# Patient Record
Sex: MALE | Race: WHITE | NOT HISPANIC OR LATINO | Employment: OTHER | URBAN - METROPOLITAN AREA
[De-identification: names, ages, dates, MRNs, and addresses within clinical notes are randomized per-mention and may not be internally consistent; named-entity substitution may affect disease eponyms.]

---

## 2023-02-07 PROBLEM — K76.89 LIVER CYST: Status: ACTIVE | Noted: 2023-02-07

## 2023-02-07 PROBLEM — H61.23 IMPACTED CERUMEN OF BOTH EARS: Status: ACTIVE | Noted: 2023-02-07

## 2023-02-07 PROBLEM — R73.03 PREDIABETES: Status: ACTIVE | Noted: 2023-02-07

## 2023-02-07 PROBLEM — G47.00 INSOMNIA: Status: ACTIVE | Noted: 2023-02-07

## 2023-02-07 PROBLEM — H65.91 FLUID LEVEL BEHIND TYMPANIC MEMBRANE OF RIGHT EAR: Status: ACTIVE | Noted: 2023-02-07

## 2023-02-07 PROBLEM — I71.21 THORACIC ASCENDING AORTIC ANEURYSM (CMS-HCC): Status: ACTIVE | Noted: 2023-02-07

## 2023-02-07 PROBLEM — J30.9 ALLERGIC RHINITIS: Status: ACTIVE | Noted: 2023-02-07

## 2023-02-07 PROBLEM — N28.1 CYST OF RIGHT KIDNEY: Status: ACTIVE | Noted: 2023-02-07

## 2023-02-07 PROBLEM — I25.10 CAD (CORONARY ARTERY DISEASE): Status: ACTIVE | Noted: 2023-02-07

## 2023-02-07 PROBLEM — N52.9 ERECTILE DYSFUNCTION: Status: ACTIVE | Noted: 2023-02-07

## 2023-02-07 PROBLEM — R17 ELEVATED BILIRUBIN: Status: ACTIVE | Noted: 2023-02-07

## 2023-02-07 PROBLEM — E78.5 HYPERLIPIDEMIA: Status: ACTIVE | Noted: 2023-02-07

## 2023-02-07 PROBLEM — I10 HYPERTENSION: Status: ACTIVE | Noted: 2023-02-07

## 2023-02-07 PROBLEM — J01.90 ACUTE SINUSITIS: Status: ACTIVE | Noted: 2023-02-07

## 2023-02-07 PROBLEM — H93.8X3 SENSATION OF FULLNESS IN BOTH EARS: Status: ACTIVE | Noted: 2023-02-07

## 2023-02-07 PROBLEM — F41.9 ANXIETY: Status: ACTIVE | Noted: 2023-02-07

## 2023-02-07 PROBLEM — R97.20 INCREASED PROSTATE SPECIFIC ANTIGEN (PSA) VELOCITY: Status: ACTIVE | Noted: 2023-02-07

## 2023-02-07 PROBLEM — R93.1 AGATSTON CORONARY ARTERY CALCIUM SCORE GREATER THAN 400: Status: ACTIVE | Noted: 2023-02-07

## 2023-02-07 PROBLEM — R91.8 LUNG NODULES: Status: ACTIVE | Noted: 2023-02-07

## 2023-02-07 RX ORDER — ATORVASTATIN CALCIUM 40 MG/1
1 TABLET, FILM COATED ORAL NIGHTLY
COMMUNITY
Start: 2013-02-20

## 2023-02-07 RX ORDER — SILDENAFIL 100 MG/1
100 TABLET, FILM COATED ORAL AS NEEDED
COMMUNITY
Start: 2013-03-27 | End: 2023-06-27 | Stop reason: SDUPTHER

## 2023-02-07 RX ORDER — ASPIRIN 81 MG/1
1 TABLET ORAL DAILY
COMMUNITY
Start: 2022-06-02

## 2023-02-07 RX ORDER — LOSARTAN POTASSIUM 100 MG/1
1 TABLET ORAL DAILY
COMMUNITY
Start: 2013-01-17 | End: 2023-06-20 | Stop reason: SDUPTHER

## 2023-02-07 RX ORDER — QUINIDINE SULFATE 200 MG
1 TABLET ORAL DAILY
COMMUNITY
Start: 2022-03-31

## 2023-02-07 RX ORDER — QUETIAPINE FUMARATE 25 MG/1
1 TABLET, FILM COATED ORAL NIGHTLY
COMMUNITY
Start: 2020-07-07 | End: 2024-03-15

## 2023-04-06 ENCOUNTER — OFFICE VISIT (OUTPATIENT)
Dept: PRIMARY CARE | Facility: CLINIC | Age: 73
End: 2023-04-06
Payer: MEDICARE

## 2023-04-06 VITALS
DIASTOLIC BLOOD PRESSURE: 80 MMHG | SYSTOLIC BLOOD PRESSURE: 152 MMHG | HEIGHT: 67 IN | BODY MASS INDEX: 38.83 KG/M2 | WEIGHT: 247.4 LBS | TEMPERATURE: 96.9 F | HEART RATE: 82 BPM

## 2023-04-06 DIAGNOSIS — Z12.12 ENCOUNTER FOR COLORECTAL CANCER SCREENING: ICD-10-CM

## 2023-04-06 DIAGNOSIS — Z00.00 MEDICARE ANNUAL WELLNESS VISIT, SUBSEQUENT: Primary | ICD-10-CM

## 2023-04-06 DIAGNOSIS — I10 PRIMARY HYPERTENSION: ICD-10-CM

## 2023-04-06 DIAGNOSIS — E55.9 VITAMIN D DEFICIENCY: ICD-10-CM

## 2023-04-06 DIAGNOSIS — R73.03 PREDIABETES: ICD-10-CM

## 2023-04-06 DIAGNOSIS — E66.01 OBESITY, MORBID (MULTI): ICD-10-CM

## 2023-04-06 DIAGNOSIS — I71.21 ANEURYSM OF ASCENDING AORTA WITHOUT RUPTURE (CMS-HCC): ICD-10-CM

## 2023-04-06 DIAGNOSIS — R97.20 INCREASED PROSTATE SPECIFIC ANTIGEN (PSA) VELOCITY: ICD-10-CM

## 2023-04-06 DIAGNOSIS — Z00.00 WELLNESS EXAMINATION: ICD-10-CM

## 2023-04-06 DIAGNOSIS — Z12.11 ENCOUNTER FOR COLORECTAL CANCER SCREENING: ICD-10-CM

## 2023-04-06 PROCEDURE — 3079F DIAST BP 80-89 MM HG: CPT | Performed by: STUDENT IN AN ORGANIZED HEALTH CARE EDUCATION/TRAINING PROGRAM

## 2023-04-06 PROCEDURE — 3077F SYST BP >= 140 MM HG: CPT | Performed by: STUDENT IN AN ORGANIZED HEALTH CARE EDUCATION/TRAINING PROGRAM

## 2023-04-06 PROCEDURE — 1159F MED LIST DOCD IN RCRD: CPT | Performed by: STUDENT IN AN ORGANIZED HEALTH CARE EDUCATION/TRAINING PROGRAM

## 2023-04-06 PROCEDURE — 1170F FXNL STATUS ASSESSED: CPT | Performed by: STUDENT IN AN ORGANIZED HEALTH CARE EDUCATION/TRAINING PROGRAM

## 2023-04-06 PROCEDURE — 99397 PER PM REEVAL EST PAT 65+ YR: CPT | Performed by: STUDENT IN AN ORGANIZED HEALTH CARE EDUCATION/TRAINING PROGRAM

## 2023-04-06 PROCEDURE — G0439 PPPS, SUBSEQ VISIT: HCPCS | Performed by: STUDENT IN AN ORGANIZED HEALTH CARE EDUCATION/TRAINING PROGRAM

## 2023-04-06 PROCEDURE — 1160F RVW MEDS BY RX/DR IN RCRD: CPT | Performed by: STUDENT IN AN ORGANIZED HEALTH CARE EDUCATION/TRAINING PROGRAM

## 2023-04-06 PROCEDURE — 1036F TOBACCO NON-USER: CPT | Performed by: STUDENT IN AN ORGANIZED HEALTH CARE EDUCATION/TRAINING PROGRAM

## 2023-04-06 RX ORDER — AMLODIPINE BESYLATE 5 MG/1
1 TABLET ORAL DAILY
COMMUNITY
Start: 2023-03-27

## 2023-04-06 RX ORDER — FLUTICASONE PROPIONATE 50 MCG
1 SPRAY, SUSPENSION (ML) NASAL
COMMUNITY
Start: 2023-02-06 | End: 2023-05-08

## 2023-04-06 ASSESSMENT — PATIENT HEALTH QUESTIONNAIRE - PHQ9
SUM OF ALL RESPONSES TO PHQ9 QUESTIONS 1 AND 2: 0
2. FEELING DOWN, DEPRESSED OR HOPELESS: NOT AT ALL
1. LITTLE INTEREST OR PLEASURE IN DOING THINGS: NOT AT ALL

## 2023-04-06 ASSESSMENT — ENCOUNTER SYMPTOMS
DIARRHEA: 0
FREQUENCY: 0
NERVOUS/ANXIOUS: 0
VOMITING: 0
OCCASIONAL FEELINGS OF UNSTEADINESS: 0
ARTHRALGIAS: 0
MYALGIAS: 0
COUGH: 0
SORE THROAT: 0
DYSPHORIC MOOD: 0
CHILLS: 0
PALPITATIONS: 0
DIZZINESS: 0
SHORTNESS OF BREATH: 0
DEPRESSION: 0
CONSTIPATION: 0
FEVER: 0
DYSURIA: 0
FATIGUE: 0
NUMBNESS: 0
LOSS OF SENSATION IN FEET: 0
LIGHT-HEADEDNESS: 0
NAUSEA: 0
RHINORRHEA: 0
ABDOMINAL PAIN: 0
COLOR CHANGE: 0

## 2023-04-06 ASSESSMENT — ACTIVITIES OF DAILY LIVING (ADL)
DOING_HOUSEWORK: INDEPENDENT
MANAGING_FINANCES: INDEPENDENT
TAKING_MEDICATION: INDEPENDENT
DRESSING: INDEPENDENT
BATHING: INDEPENDENT
GROCERY_SHOPPING: INDEPENDENT

## 2023-04-06 NOTE — PROGRESS NOTES
Subjective   Reason for Visit: Sridhar Rodriges is an 72 y.o. male here for a Medicare Wellness visit.     Past Medical, Surgical, and Family History reviewed and updated in chart.    Reviewed all medications by prescribing practitioner or clinical pharmacist (such as prescriptions, OTCs, herbal therapies and supplements) and documented in the medical record.    HPI    No acute concerns or complaints today.  He follows with cardiology once a year.  He has an ascending thoracic aorta. He is getting that followed yearly.    He has a history of an elevated PSA. He had a prostate biopsy in the past about 15-20 years ago. That was normal.    Dental: Once yearly.  Vision: Wears glasses. He is due for another appointment.    Last Colonoscopy: EGD in 2017. No recent colonoscopy in ~2010.  AAA Screening: Never smoked. Not indicated.  Low Dose Lung CT Screening: Not indicated. Never smoked.    Influenza: Up to date  Tetanus: 2020  Pneumonia: Complete  COVID: Up to date  Shingles: Not given. Recommended.       Patient Care Team:  Juan Martinez MD as PCP - General  Mega Beal MD as PCP - [employer]age PCP  Betsy Dubois MD as Consulting Physician (Cardiology)     Review of Systems   Constitutional:  Negative for chills, fatigue and fever.   HENT:  Negative for congestion, rhinorrhea and sore throat.    Eyes:  Negative for visual disturbance.   Respiratory:  Negative for cough and shortness of breath.    Cardiovascular:  Negative for chest pain and palpitations.   Gastrointestinal:  Negative for abdominal pain, constipation, diarrhea, nausea and vomiting.   Genitourinary:  Negative for dysuria and frequency.   Musculoskeletal:  Negative for arthralgias and myalgias.   Skin:  Negative for color change and rash.   Neurological:  Negative for dizziness, light-headedness and numbness.   Psychiatric/Behavioral:  Negative for dysphoric mood. The patient is not nervous/anxious.        Objective   Vitals:  /80    "Pulse 82   Temp 36.1 °C (96.9 °F) (Temporal)   Ht 1.702 m (5' 7\")   Wt 112 kg (247 lb 6.4 oz)   BMI 38.75 kg/m²       Physical Exam  Vitals and nursing note reviewed.   Constitutional:       General: He is not in acute distress.     Appearance: Normal appearance. He is normal weight. He is not ill-appearing or toxic-appearing.   HENT:      Head: Normocephalic and atraumatic.      Right Ear: Tympanic membrane, ear canal and external ear normal.      Left Ear: Tympanic membrane, ear canal and external ear normal.      Nose: Nose normal.      Mouth/Throat:      Mouth: Mucous membranes are moist.      Pharynx: Oropharynx is clear.   Eyes:      Extraocular Movements: Extraocular movements intact.      Conjunctiva/sclera: Conjunctivae normal.      Pupils: Pupils are equal, round, and reactive to light.   Cardiovascular:      Rate and Rhythm: Normal rate and regular rhythm.      Pulses: Normal pulses.      Heart sounds: Normal heart sounds.   Pulmonary:      Effort: Pulmonary effort is normal.      Breath sounds: Normal breath sounds.   Abdominal:      General: Abdomen is flat. Bowel sounds are normal.      Palpations: Abdomen is soft.   Musculoskeletal:         General: Normal range of motion.      Cervical back: Normal range of motion and neck supple.   Skin:     General: Skin is warm and dry.   Neurological:      General: No focal deficit present.      Mental Status: He is alert and oriented to person, place, and time. Mental status is at baseline.      Cranial Nerves: No cranial nerve deficit.      Sensory: No sensory deficit.      Motor: No weakness.   Psychiatric:         Mood and Affect: Mood normal.         Behavior: Behavior normal.         Thought Content: Thought content normal.         Judgment: Judgment normal.         Assessment/Plan   Problem List Items Addressed This Visit          Circulatory    Hypertension    Current Assessment & Plan     Blood pressure elevated today in the office.  Reports that " with his home blood pressure cuff he is in the 130s over 80s.  Doing well on the amlodipine he started about a week ago.  We will follow-up for nurse visit blood pressure check in 2 to 3 weeks after his trip to [place].  He will bring a log of his pressures and home blood pressure cuff with him.         Relevant Orders    CBC and Auto Differential    TSH with reflex to Free T4 if abnormal    Thoracic ascending aortic aneurysm (CMS/HCC)    Overview     Following with cardiology.         Current Assessment & Plan     Patient is following with cardiology.  He has an appointment for upcoming testing in May and follow-up in July.            Endocrine/Metabolic    Prediabetes    Current Assessment & Plan     Blood work ordered to be done including A1c.         Relevant Orders    CBC and Auto Differential    Vitamin D, Total    Hemoglobin A1C    Obesity, morbid (CMS/HCC)    Current Assessment & Plan     Discussed healthy diet and exercise.  Blood work ordered.         Relevant Orders    Vitamin D, Total       Other    Increased prostate specific antigen (PSA) velocity    Current Assessment & Plan     History of increased PSA level.  Repeat level ordered.         Relevant Orders    Prostate Specific Antigen    Encounter for colorectal cancer screening    Current Assessment & Plan     Colonoscopy ordered.  He will call if he does not receive a call to be scheduled within the next week.  He thinks he will probably have to do it over the summer after completing testing for his cardiologist.         Relevant Orders    Colonoscopy    Medicare annual wellness visit, subsequent - Primary    Current Assessment & Plan     Screening questions completed.  No acute concerns or complaints today.  He follows regularly with cardiology and has upcoming testing scheduled.  Reordered colonoscopy.  reviewed recent fasting lab work from cardiology.  Ordered additional lab work to be done.         Relevant Orders    CBC and Auto  Differential    Prostate Specific Antigen    TSH with reflex to Free T4 if abnormal    Urinalysis with Reflex Microscopic    Vitamin D, Total    Hemoglobin A1C    Colonoscopy     Other Visit Diagnoses       Wellness examination        Relevant Orders    CBC and Auto Differential    Prostate Specific Antigen    TSH with reflex to Free T4 if abnormal    Urinalysis with Reflex Microscopic    Vitamin D, Total    Hemoglobin A1C    Colonoscopy    Vitamin D deficiency        Relevant Orders    Vitamin D, Total          Patient presenting for Medicare annual as well as regular wellness visit.  Blood work ordered to be done.  Reviewed recent results of fasting lab work for cardiology.

## 2023-04-06 NOTE — PATIENT INSTRUCTIONS
Call for nurse visit blood pressure check in about 2 weeks.  Bring log of pressures and your home cuff with you to the appointment.

## 2023-05-02 ENCOUNTER — LAB (OUTPATIENT)
Dept: LAB | Facility: LAB | Age: 73
End: 2023-05-02
Payer: MEDICARE

## 2023-05-02 DIAGNOSIS — R97.20 INCREASED PROSTATE SPECIFIC ANTIGEN (PSA) VELOCITY: ICD-10-CM

## 2023-05-02 DIAGNOSIS — Z00.00 MEDICARE ANNUAL WELLNESS VISIT, SUBSEQUENT: ICD-10-CM

## 2023-05-02 DIAGNOSIS — E55.9 VITAMIN D DEFICIENCY: ICD-10-CM

## 2023-05-02 DIAGNOSIS — R73.03 PREDIABETES: ICD-10-CM

## 2023-05-02 DIAGNOSIS — E66.01 OBESITY, MORBID (MULTI): ICD-10-CM

## 2023-05-02 DIAGNOSIS — Z00.00 WELLNESS EXAMINATION: ICD-10-CM

## 2023-05-02 DIAGNOSIS — I10 PRIMARY HYPERTENSION: ICD-10-CM

## 2023-05-02 LAB
ANION GAP IN SER/PLAS: 11 MMOL/L (ref 10–20)
APPEARANCE, URINE: CLEAR
BASOPHILS (10*3/UL) IN BLOOD BY AUTOMATED COUNT: 0.03 X10E9/L (ref 0–0.1)
BASOPHILS/100 LEUKOCYTES IN BLOOD BY AUTOMATED COUNT: 0.5 % (ref 0–2)
BILIRUBIN, URINE: NEGATIVE
BLOOD, URINE: NEGATIVE
CALCIUM (MG/DL) IN SER/PLAS: 9.4 MG/DL (ref 8.6–10.6)
CARBON DIOXIDE, TOTAL (MMOL/L) IN SER/PLAS: 29 MMOL/L (ref 21–32)
CHLORIDE (MMOL/L) IN SER/PLAS: 105 MMOL/L (ref 98–107)
COLOR, URINE: NORMAL
CREATININE (MG/DL) IN SER/PLAS: 0.82 MG/DL (ref 0.5–1.3)
EOSINOPHILS (10*3/UL) IN BLOOD BY AUTOMATED COUNT: 0.21 X10E9/L (ref 0–0.4)
EOSINOPHILS/100 LEUKOCYTES IN BLOOD BY AUTOMATED COUNT: 3.2 % (ref 0–6)
ERYTHROCYTE DISTRIBUTION WIDTH (RATIO) BY AUTOMATED COUNT: 12 % (ref 11.5–14.5)
ERYTHROCYTE MEAN CORPUSCULAR HEMOGLOBIN CONCENTRATION (G/DL) BY AUTOMATED: 32.5 G/DL (ref 32–36)
ERYTHROCYTE MEAN CORPUSCULAR VOLUME (FL) BY AUTOMATED COUNT: 92 FL (ref 80–100)
ERYTHROCYTES (10*6/UL) IN BLOOD BY AUTOMATED COUNT: 4.96 X10E12/L (ref 4.5–5.9)
ESTIMATED AVERAGE GLUCOSE FOR HBA1C: 140 MG/DL
GFR MALE: >90 ML/MIN/1.73M2
GLUCOSE (MG/DL) IN SER/PLAS: 128 MG/DL (ref 74–99)
GLUCOSE, URINE: NEGATIVE MG/DL
HEMATOCRIT (%) IN BLOOD BY AUTOMATED COUNT: 45.6 % (ref 41–52)
HEMOGLOBIN (G/DL) IN BLOOD: 14.8 G/DL (ref 13.5–17.5)
HEMOGLOBIN A1C/HEMOGLOBIN TOTAL IN BLOOD: 6.5 %
IMMATURE GRANULOCYTES/100 LEUKOCYTES IN BLOOD BY AUTOMATED COUNT: 0.6 % (ref 0–0.9)
KETONES, URINE: NEGATIVE MG/DL
LEUKOCYTE ESTERASE, URINE: NEGATIVE
LEUKOCYTES (10*3/UL) IN BLOOD BY AUTOMATED COUNT: 6.6 X10E9/L (ref 4.4–11.3)
LYMPHOCYTES (10*3/UL) IN BLOOD BY AUTOMATED COUNT: 1.59 X10E9/L (ref 0.8–3)
LYMPHOCYTES/100 LEUKOCYTES IN BLOOD BY AUTOMATED COUNT: 24 % (ref 13–44)
MONOCYTES (10*3/UL) IN BLOOD BY AUTOMATED COUNT: 0.62 X10E9/L (ref 0.05–0.8)
MONOCYTES/100 LEUKOCYTES IN BLOOD BY AUTOMATED COUNT: 9.4 % (ref 2–10)
NEUTROPHILS (10*3/UL) IN BLOOD BY AUTOMATED COUNT: 4.14 X10E9/L (ref 1.6–5.5)
NEUTROPHILS/100 LEUKOCYTES IN BLOOD BY AUTOMATED COUNT: 62.3 % (ref 40–80)
NITRITE, URINE: NEGATIVE
NRBC (PER 100 WBCS) BY AUTOMATED COUNT: 0 /100 WBC (ref 0–0)
PH, URINE: 7 (ref 5–8)
PLATELETS (10*3/UL) IN BLOOD AUTOMATED COUNT: 206 X10E9/L (ref 150–450)
POTASSIUM (MMOL/L) IN SER/PLAS: 4.5 MMOL/L (ref 3.5–5.3)
PROTEIN, URINE: NEGATIVE MG/DL
SODIUM (MMOL/L) IN SER/PLAS: 140 MMOL/L (ref 136–145)
SPECIFIC GRAVITY, URINE: 1.01 (ref 1–1.03)
UREA NITROGEN (MG/DL) IN SER/PLAS: 14 MG/DL (ref 6–23)
UROBILINOGEN, URINE: <2 MG/DL (ref 0–1.9)

## 2023-05-02 PROCEDURE — 36415 COLL VENOUS BLD VENIPUNCTURE: CPT

## 2023-05-02 PROCEDURE — 84443 ASSAY THYROID STIM HORMONE: CPT

## 2023-05-02 PROCEDURE — 85025 COMPLETE CBC W/AUTO DIFF WBC: CPT

## 2023-05-02 PROCEDURE — 83036 HEMOGLOBIN GLYCOSYLATED A1C: CPT

## 2023-05-02 PROCEDURE — 82306 VITAMIN D 25 HYDROXY: CPT

## 2023-05-02 PROCEDURE — 81003 URINALYSIS AUTO W/O SCOPE: CPT

## 2023-05-02 PROCEDURE — 84153 ASSAY OF PSA TOTAL: CPT

## 2023-05-03 LAB
CALCIDIOL (25 OH VITAMIN D3) (NG/ML) IN SER/PLAS: 38 NG/ML
PROSTATE SPECIFIC AG (NG/ML) IN SER/PLAS: 2.88 NG/ML (ref 0–4)
THYROTROPIN (MIU/L) IN SER/PLAS BY DETECTION LIMIT <= 0.05 MIU/L: 1.38 MIU/L (ref 0.44–3.98)

## 2023-05-05 DIAGNOSIS — J30.9 ALLERGIC RHINITIS, UNSPECIFIED: ICD-10-CM

## 2023-05-08 RX ORDER — FLUTICASONE PROPIONATE 50 MCG
SPRAY, SUSPENSION (ML) NASAL
Qty: 48 ML | Refills: 1 | Status: SHIPPED | OUTPATIENT
Start: 2023-05-08

## 2023-06-20 DIAGNOSIS — I10 PRIMARY HYPERTENSION: ICD-10-CM

## 2023-06-21 RX ORDER — LOSARTAN POTASSIUM 100 MG/1
100 TABLET ORAL DAILY
Qty: 90 TABLET | Refills: 1 | Status: SHIPPED | OUTPATIENT
Start: 2023-06-21 | End: 2023-12-18

## 2023-06-27 ENCOUNTER — TELEPHONE (OUTPATIENT)
Dept: PRIMARY CARE | Facility: CLINIC | Age: 73
End: 2023-06-27
Payer: MEDICARE

## 2023-06-27 DIAGNOSIS — N52.9 ERECTILE DYSFUNCTION, UNSPECIFIED ERECTILE DYSFUNCTION TYPE: ICD-10-CM

## 2023-06-27 RX ORDER — SILDENAFIL 100 MG/1
100 TABLET, FILM COATED ORAL AS NEEDED
Qty: 10 TABLET | Refills: 1 | Status: SHIPPED | OUTPATIENT
Start: 2023-06-27 | End: 2023-06-29 | Stop reason: SDUPTHER

## 2023-06-29 DIAGNOSIS — N52.9 ERECTILE DYSFUNCTION, UNSPECIFIED ERECTILE DYSFUNCTION TYPE: ICD-10-CM

## 2023-06-29 RX ORDER — SILDENAFIL 100 MG/1
100 TABLET, FILM COATED ORAL AS NEEDED
Qty: 10 TABLET | Refills: 1 | Status: SHIPPED | OUTPATIENT
Start: 2023-06-29 | End: 2023-09-27

## 2023-06-29 RX ORDER — SILDENAFIL 100 MG/1
100 TABLET, FILM COATED ORAL DAILY PRN
Qty: 10 TABLET | Refills: 1 | OUTPATIENT
Start: 2023-06-29 | End: 2023-07-29

## 2023-12-17 DIAGNOSIS — I10 PRIMARY HYPERTENSION: ICD-10-CM

## 2023-12-18 RX ORDER — LOSARTAN POTASSIUM 100 MG/1
100 TABLET ORAL DAILY
Qty: 90 TABLET | Refills: 1 | Status: SHIPPED | OUTPATIENT
Start: 2023-12-18 | End: 2024-06-07

## 2024-03-14 DIAGNOSIS — F41.9 ANXIETY: Primary | ICD-10-CM

## 2024-03-15 RX ORDER — QUETIAPINE FUMARATE 25 MG/1
25 TABLET, FILM COATED ORAL NIGHTLY
Qty: 90 TABLET | Refills: 1 | Status: SHIPPED | OUTPATIENT
Start: 2024-03-15

## 2024-06-07 DIAGNOSIS — I10 PRIMARY HYPERTENSION: ICD-10-CM

## 2024-06-07 RX ORDER — LOSARTAN POTASSIUM 100 MG/1
100 TABLET ORAL DAILY
Qty: 90 TABLET | Refills: 1 | Status: SHIPPED | OUTPATIENT
Start: 2024-06-07

## 2024-07-29 DIAGNOSIS — E78.5 HYPERLIPIDEMIA, UNSPECIFIED: ICD-10-CM

## 2024-07-30 RX ORDER — ATORVASTATIN CALCIUM 40 MG/1
40 TABLET, FILM COATED ORAL NIGHTLY
Qty: 90 TABLET | Refills: 3 | Status: SHIPPED | OUTPATIENT
Start: 2024-07-30

## 2024-09-08 DIAGNOSIS — F41.9 ANXIETY: ICD-10-CM

## 2024-09-08 DIAGNOSIS — I10 PRIMARY HYPERTENSION: Primary | ICD-10-CM

## 2024-09-08 DIAGNOSIS — E55.9 VITAMIN D DEFICIENCY: ICD-10-CM

## 2024-09-08 DIAGNOSIS — R97.20 INCREASED PROSTATE SPECIFIC ANTIGEN (PSA) VELOCITY: ICD-10-CM

## 2024-09-08 DIAGNOSIS — R73.03 PREDIABETES: ICD-10-CM

## 2024-09-11 RX ORDER — QUETIAPINE FUMARATE 25 MG/1
25 TABLET, FILM COATED ORAL NIGHTLY
Qty: 90 TABLET | Refills: 0 | Status: SHIPPED | OUTPATIENT
Start: 2024-09-11

## 2024-09-11 NOTE — TELEPHONE ENCOUNTER
Pt scheduled for AWV on 11/12/24; pt will go to lab downstairs for blood work; pt requesting order for A1C as well and pt would like to know it waiting till this appt for flu shot is acceptable or should he get it earlier than 11/12? Please advise

## 2024-09-12 NOTE — TELEPHONE ENCOUNTER
Patient informed flu shot can be done late Sept or early Oct. Blood work orders have been entered.

## 2024-10-02 ENCOUNTER — TELEPHONE (OUTPATIENT)
Dept: PRIMARY CARE | Facility: CLINIC | Age: 74
End: 2024-10-02
Payer: MEDICARE

## 2024-10-02 DIAGNOSIS — N52.9 ERECTILE DYSFUNCTION, UNSPECIFIED ERECTILE DYSFUNCTION TYPE: ICD-10-CM

## 2024-10-02 RX ORDER — SILDENAFIL 100 MG/1
100 TABLET, FILM COATED ORAL AS NEEDED
Qty: 16 TABLET | Refills: 1 | Status: SHIPPED | OUTPATIENT
Start: 2024-10-02 | End: 2024-10-04 | Stop reason: SDUPTHER

## 2024-10-02 NOTE — TELEPHONE ENCOUNTER
Fax request from [facility] requesting a refill on:    sildenafil (Viagra) 100 mg tablet   Take 1 tablet (100 mg) by mouth if needed for erectile dysfunction.   Quantity: 10 tablet     [id number] - [place] - [address]   [address] [place] [address]  Phone: [phone]  Fax: [phone]

## 2024-10-04 ENCOUNTER — TELEPHONE (OUTPATIENT)
Dept: PRIMARY CARE | Facility: CLINIC | Age: 74
End: 2024-10-04
Payer: MEDICARE

## 2024-10-04 DIAGNOSIS — N52.9 ERECTILE DYSFUNCTION, UNSPECIFIED ERECTILE DYSFUNCTION TYPE: ICD-10-CM

## 2024-10-04 RX ORDER — SILDENAFIL 100 MG/1
100 TABLET, FILM COATED ORAL AS NEEDED
Qty: 16 TABLET | Refills: 1 | Status: SHIPPED | OUTPATIENT
Start: 2024-10-04 | End: 2025-01-02

## 2024-10-04 NOTE — TELEPHONE ENCOUNTER
Fax request from [facility] requesting a refill on:    sildenafil (Viagra) 100 mg tablet   Take 1 tablet (100 mg) by mouth if needed for erectile dysfunction.   Quantity: 16 tablet     [id number] - [place] - [address]   [address] [place] [address]  Phone: [phone]  Fax: [phone]

## 2024-10-15 DIAGNOSIS — I10 ESSENTIAL (PRIMARY) HYPERTENSION: ICD-10-CM

## 2024-10-16 RX ORDER — AMLODIPINE BESYLATE 5 MG/1
5 TABLET ORAL DAILY
Qty: 90 TABLET | Refills: 3 | Status: SHIPPED | OUTPATIENT
Start: 2024-10-16

## 2024-11-08 ENCOUNTER — LAB (OUTPATIENT)
Dept: LAB | Facility: LAB | Age: 74
End: 2024-11-08
Payer: MEDICARE

## 2024-11-08 DIAGNOSIS — R97.20 INCREASED PROSTATE SPECIFIC ANTIGEN (PSA) VELOCITY: ICD-10-CM

## 2024-11-08 DIAGNOSIS — I10 PRIMARY HYPERTENSION: ICD-10-CM

## 2024-11-08 DIAGNOSIS — E55.9 VITAMIN D DEFICIENCY: ICD-10-CM

## 2024-11-08 DIAGNOSIS — R73.03 PREDIABETES: ICD-10-CM

## 2024-11-08 LAB
25(OH)D3 SERPL-MCNC: 41 NG/ML (ref 30–100)
ALBUMIN SERPL BCP-MCNC: 4.4 G/DL (ref 3.4–5)
ALP SERPL-CCNC: 100 U/L (ref 33–136)
ALT SERPL W P-5'-P-CCNC: 19 U/L (ref 10–52)
ANION GAP SERPL CALC-SCNC: 11 MMOL/L (ref 10–20)
AST SERPL W P-5'-P-CCNC: 17 U/L (ref 9–39)
BASOPHILS # BLD AUTO: 0.03 X10*3/UL (ref 0–0.1)
BASOPHILS NFR BLD AUTO: 0.4 %
BILIRUB SERPL-MCNC: 1.5 MG/DL (ref 0–1.2)
BUN SERPL-MCNC: 15 MG/DL (ref 6–23)
CALCIUM SERPL-MCNC: 9.3 MG/DL (ref 8.6–10.6)
CHLORIDE SERPL-SCNC: 103 MMOL/L (ref 98–107)
CHOLEST SERPL-MCNC: 130 MG/DL (ref 0–199)
CHOLESTEROL/HDL RATIO: 3.2
CO2 SERPL-SCNC: 30 MMOL/L (ref 21–32)
CREAT SERPL-MCNC: 0.79 MG/DL (ref 0.5–1.3)
CREAT UR-MCNC: 85.4 MG/DL (ref 20–370)
EGFRCR SERPLBLD CKD-EPI 2021: >90 ML/MIN/1.73M*2
EOSINOPHIL # BLD AUTO: 0.26 X10*3/UL (ref 0–0.4)
EOSINOPHIL NFR BLD AUTO: 3.5 %
ERYTHROCYTE [DISTWIDTH] IN BLOOD BY AUTOMATED COUNT: 12 % (ref 11.5–14.5)
EST. AVERAGE GLUCOSE BLD GHB EST-MCNC: 140 MG/DL
GLUCOSE SERPL-MCNC: 130 MG/DL (ref 74–99)
HBA1C MFR BLD: 6.5 %
HCT VFR BLD AUTO: 45.6 % (ref 41–52)
HDLC SERPL-MCNC: 40.9 MG/DL
HGB BLD-MCNC: 14.7 G/DL (ref 13.5–17.5)
IMM GRANULOCYTES # BLD AUTO: 0.04 X10*3/UL (ref 0–0.5)
IMM GRANULOCYTES NFR BLD AUTO: 0.5 % (ref 0–0.9)
LDLC SERPL CALC-MCNC: 67 MG/DL
LYMPHOCYTES # BLD AUTO: 1.94 X10*3/UL (ref 0.8–3)
LYMPHOCYTES NFR BLD AUTO: 26.2 %
MCH RBC QN AUTO: 30.1 PG (ref 26–34)
MCHC RBC AUTO-ENTMCNC: 32.2 G/DL (ref 32–36)
MCV RBC AUTO: 93 FL (ref 80–100)
MICROALBUMIN UR-MCNC: <7 MG/L
MICROALBUMIN/CREAT UR: NORMAL MG/G{CREAT}
MONOCYTES # BLD AUTO: 0.69 X10*3/UL (ref 0.05–0.8)
MONOCYTES NFR BLD AUTO: 9.3 %
NEUTROPHILS # BLD AUTO: 4.45 X10*3/UL (ref 1.6–5.5)
NEUTROPHILS NFR BLD AUTO: 60.1 %
NON HDL CHOLESTEROL: 89 MG/DL (ref 0–149)
NRBC BLD-RTO: 0 /100 WBCS (ref 0–0)
PLATELET # BLD AUTO: 214 X10*3/UL (ref 150–450)
POTASSIUM SERPL-SCNC: 4.4 MMOL/L (ref 3.5–5.3)
PROT SERPL-MCNC: 6.6 G/DL (ref 6.4–8.2)
PSA SERPL-MCNC: 2.8 NG/ML
RBC # BLD AUTO: 4.88 X10*6/UL (ref 4.5–5.9)
SODIUM SERPL-SCNC: 140 MMOL/L (ref 136–145)
TRIGL SERPL-MCNC: 111 MG/DL (ref 0–149)
TSH SERPL-ACNC: 1.95 MIU/L (ref 0.44–3.98)
VLDL: 22 MG/DL (ref 0–40)
WBC # BLD AUTO: 7.4 X10*3/UL (ref 4.4–11.3)

## 2024-11-08 PROCEDURE — 84443 ASSAY THYROID STIM HORMONE: CPT

## 2024-11-08 PROCEDURE — 85025 COMPLETE CBC W/AUTO DIFF WBC: CPT

## 2024-11-08 PROCEDURE — 82043 UR ALBUMIN QUANTITATIVE: CPT

## 2024-11-08 PROCEDURE — 82306 VITAMIN D 25 HYDROXY: CPT

## 2024-11-08 PROCEDURE — 84153 ASSAY OF PSA TOTAL: CPT

## 2024-11-08 PROCEDURE — 83036 HEMOGLOBIN GLYCOSYLATED A1C: CPT

## 2024-11-08 PROCEDURE — 80053 COMPREHEN METABOLIC PANEL: CPT

## 2024-11-08 PROCEDURE — 36415 COLL VENOUS BLD VENIPUNCTURE: CPT

## 2024-11-08 PROCEDURE — 82570 ASSAY OF URINE CREATININE: CPT

## 2024-11-08 PROCEDURE — 80061 LIPID PANEL: CPT

## 2024-11-12 ENCOUNTER — APPOINTMENT (OUTPATIENT)
Dept: PRIMARY CARE | Facility: CLINIC | Age: 74
End: 2024-11-12
Payer: MEDICARE

## 2024-11-12 VITALS
SYSTOLIC BLOOD PRESSURE: 154 MMHG | DIASTOLIC BLOOD PRESSURE: 86 MMHG | HEIGHT: 68 IN | WEIGHT: 255.4 LBS | TEMPERATURE: 97.6 F | HEART RATE: 68 BPM | BODY MASS INDEX: 38.71 KG/M2

## 2024-11-12 DIAGNOSIS — R91.8 LUNG NODULES: ICD-10-CM

## 2024-11-12 DIAGNOSIS — Z00.00 MEDICARE ANNUAL WELLNESS VISIT, SUBSEQUENT: ICD-10-CM

## 2024-11-12 DIAGNOSIS — Z71.89 ADVANCE DIRECTIVE DISCUSSED WITH PATIENT: ICD-10-CM

## 2024-11-12 DIAGNOSIS — I71.21 ANEURYSM OF ASCENDING AORTA WITHOUT RUPTURE (CMS-HCC): ICD-10-CM

## 2024-11-12 DIAGNOSIS — Z12.12 SCREENING FOR COLORECTAL CANCER: ICD-10-CM

## 2024-11-12 DIAGNOSIS — Z12.11 SCREENING FOR COLORECTAL CANCER: ICD-10-CM

## 2024-11-12 DIAGNOSIS — N52.9 ERECTILE DYSFUNCTION, UNSPECIFIED ERECTILE DYSFUNCTION TYPE: ICD-10-CM

## 2024-11-12 DIAGNOSIS — R93.1 AGATSTON CORONARY ARTERY CALCIUM SCORE GREATER THAN 400: ICD-10-CM

## 2024-11-12 DIAGNOSIS — E11.65 TYPE 2 DIABETES MELLITUS WITH HYPERGLYCEMIA, WITHOUT LONG-TERM CURRENT USE OF INSULIN: ICD-10-CM

## 2024-11-12 DIAGNOSIS — Z71.89 CARDIAC RISK COUNSELING: ICD-10-CM

## 2024-11-12 DIAGNOSIS — I25.10 CORONARY ARTERY DISEASE INVOLVING NATIVE HEART WITHOUT ANGINA PECTORIS, UNSPECIFIED VESSEL OR LESION TYPE: ICD-10-CM

## 2024-11-12 DIAGNOSIS — K76.89 LIVER CYST: ICD-10-CM

## 2024-11-12 DIAGNOSIS — Z00.00 ROUTINE GENERAL MEDICAL EXAMINATION AT HEALTH CARE FACILITY: Primary | ICD-10-CM

## 2024-11-12 DIAGNOSIS — E78.2 MIXED HYPERLIPIDEMIA: ICD-10-CM

## 2024-11-12 DIAGNOSIS — Z23 NEED FOR VACCINATION: ICD-10-CM

## 2024-11-12 DIAGNOSIS — I10 PRIMARY HYPERTENSION: ICD-10-CM

## 2024-11-12 PROBLEM — E66.01 OBESITY, MORBID (MULTI): Status: RESOLVED | Noted: 2023-04-06 | Resolved: 2024-11-12

## 2024-11-12 PROBLEM — J01.90 ACUTE SINUSITIS: Status: RESOLVED | Noted: 2023-02-07 | Resolved: 2024-11-12

## 2024-11-12 PROBLEM — H93.8X3 SENSATION OF FULLNESS IN BOTH EARS: Status: RESOLVED | Noted: 2023-02-07 | Resolved: 2024-11-12

## 2024-11-12 PROBLEM — R73.03 PREDIABETES: Status: RESOLVED | Noted: 2023-02-07 | Resolved: 2024-11-12

## 2024-11-12 PROBLEM — R97.20 INCREASED PROSTATE SPECIFIC ANTIGEN (PSA) VELOCITY: Status: RESOLVED | Noted: 2023-02-07 | Resolved: 2024-11-12

## 2024-11-12 PROBLEM — J30.9 ALLERGIC RHINITIS: Status: RESOLVED | Noted: 2023-02-07 | Resolved: 2024-11-12

## 2024-11-12 PROBLEM — H65.91 FLUID LEVEL BEHIND TYMPANIC MEMBRANE OF RIGHT EAR: Status: RESOLVED | Noted: 2023-02-07 | Resolved: 2024-11-12

## 2024-11-12 PROCEDURE — 1159F MED LIST DOCD IN RCRD: CPT | Performed by: STUDENT IN AN ORGANIZED HEALTH CARE EDUCATION/TRAINING PROGRAM

## 2024-11-12 PROCEDURE — 3079F DIAST BP 80-89 MM HG: CPT | Performed by: STUDENT IN AN ORGANIZED HEALTH CARE EDUCATION/TRAINING PROGRAM

## 2024-11-12 PROCEDURE — 3008F BODY MASS INDEX DOCD: CPT | Performed by: STUDENT IN AN ORGANIZED HEALTH CARE EDUCATION/TRAINING PROGRAM

## 2024-11-12 PROCEDURE — 1123F ACP DISCUSS/DSCN MKR DOCD: CPT | Performed by: STUDENT IN AN ORGANIZED HEALTH CARE EDUCATION/TRAINING PROGRAM

## 2024-11-12 PROCEDURE — 3048F LDL-C <100 MG/DL: CPT | Performed by: STUDENT IN AN ORGANIZED HEALTH CARE EDUCATION/TRAINING PROGRAM

## 2024-11-12 PROCEDURE — G0008 ADMIN INFLUENZA VIRUS VAC: HCPCS | Performed by: STUDENT IN AN ORGANIZED HEALTH CARE EDUCATION/TRAINING PROGRAM

## 2024-11-12 PROCEDURE — 99213 OFFICE O/P EST LOW 20 MIN: CPT | Performed by: STUDENT IN AN ORGANIZED HEALTH CARE EDUCATION/TRAINING PROGRAM

## 2024-11-12 PROCEDURE — 3044F HG A1C LEVEL LT 7.0%: CPT | Performed by: STUDENT IN AN ORGANIZED HEALTH CARE EDUCATION/TRAINING PROGRAM

## 2024-11-12 PROCEDURE — 99397 PER PM REEVAL EST PAT 65+ YR: CPT | Performed by: STUDENT IN AN ORGANIZED HEALTH CARE EDUCATION/TRAINING PROGRAM

## 2024-11-12 PROCEDURE — 1158F ADVNC CARE PLAN TLK DOCD: CPT | Performed by: STUDENT IN AN ORGANIZED HEALTH CARE EDUCATION/TRAINING PROGRAM

## 2024-11-12 PROCEDURE — 1160F RVW MEDS BY RX/DR IN RCRD: CPT | Performed by: STUDENT IN AN ORGANIZED HEALTH CARE EDUCATION/TRAINING PROGRAM

## 2024-11-12 PROCEDURE — 3077F SYST BP >= 140 MM HG: CPT | Performed by: STUDENT IN AN ORGANIZED HEALTH CARE EDUCATION/TRAINING PROGRAM

## 2024-11-12 PROCEDURE — 4010F ACE/ARB THERAPY RXD/TAKEN: CPT | Performed by: STUDENT IN AN ORGANIZED HEALTH CARE EDUCATION/TRAINING PROGRAM

## 2024-11-12 PROCEDURE — 90662 IIV NO PRSV INCREASED AG IM: CPT | Performed by: STUDENT IN AN ORGANIZED HEALTH CARE EDUCATION/TRAINING PROGRAM

## 2024-11-12 PROCEDURE — 3062F POS MACROALBUMINURIA REV: CPT | Performed by: STUDENT IN AN ORGANIZED HEALTH CARE EDUCATION/TRAINING PROGRAM

## 2024-11-12 PROCEDURE — 1170F FXNL STATUS ASSESSED: CPT | Performed by: STUDENT IN AN ORGANIZED HEALTH CARE EDUCATION/TRAINING PROGRAM

## 2024-11-12 PROCEDURE — G0439 PPPS, SUBSEQ VISIT: HCPCS | Performed by: STUDENT IN AN ORGANIZED HEALTH CARE EDUCATION/TRAINING PROGRAM

## 2024-11-12 ASSESSMENT — ENCOUNTER SYMPTOMS
LIGHT-HEADEDNESS: 0
COUGH: 0
ABDOMINAL PAIN: 0
NERVOUS/ANXIOUS: 0
SHORTNESS OF BREATH: 0
LOSS OF SENSATION IN FEET: 0
CHILLS: 0
ARTHRALGIAS: 0
CONSTIPATION: 0
FREQUENCY: 0
DEPRESSION: 0
SORE THROAT: 0
DIZZINESS: 0
OCCASIONAL FEELINGS OF UNSTEADINESS: 0
FEVER: 0
COLOR CHANGE: 0
MYALGIAS: 0
NUMBNESS: 0
NAUSEA: 0
FATIGUE: 0
DIARRHEA: 0
PALPITATIONS: 0
VOMITING: 0
RHINORRHEA: 0
DYSURIA: 0
DYSPHORIC MOOD: 0

## 2024-11-12 ASSESSMENT — ACTIVITIES OF DAILY LIVING (ADL)
TAKING_MEDICATION: INDEPENDENT
BATHING: INDEPENDENT
DRESSING: INDEPENDENT
MANAGING_FINANCES: INDEPENDENT
DOING_HOUSEWORK: INDEPENDENT
GROCERY_SHOPPING: INDEPENDENT

## 2024-11-12 NOTE — ASSESSMENT & PLAN NOTE
3/28/2022 score over 1000 and following with cardiology since that time. Negative Nuclear stress test.

## 2024-11-12 NOTE — ASSESSMENT & PLAN NOTE
Reason for Disposition   Message left on unidentified voice mail.  Phone number verified.    Protocols used: NO CONTACT OR DUPLICATE CONTACT CALL-A-AH       Never smoker. No new changes in CT angio chest from 05/2023.

## 2024-11-12 NOTE — ASSESSMENT & PLAN NOTE
Chronic. Stable. Blood pressures normal at home and elevated in the office. On amlodipine and losartan.

## 2024-11-12 NOTE — ACP (ADVANCE CARE PLANNING)
Confirming Previous Code Status:   Advance Care Planning Note     Discussion Date: 11/12/24   Discussion Participants: patient    The patient wishes to discuss Advance Care Planning today and the following is a brief summary of our discussion.     Patient has capacity to make their own medical decisions: Yes  Health Care Agent/Surrogate Decision Maker documented in chart: Yes    Documents on file and valid:  Advance Directive/Living Will: No Talking about it and planning on getting done soon.  Health Care Power of : No Talking about it and planning on getting done soon.  Other: Wife: Alivia Rodriges    Communication of Medical Status/Prognosis:   Good.     Communication of Treatment Goals/Options:   Maintaining health and independence and being active with golfing and spending time with family      Treatment Decisions  Goals of Care: survival is prioritized, if goals for quality or survival can reasonably be achieved     Follow Up Plan  Yearly and as needed.    Team Members  Patient, Spouse    Time Statement: Total face to face time spent on advance care planning was 16 minutes with 16 minutes spent in counseling, including the explanation.    Juan Martinez,   11/12/2024 9:00 AM

## 2024-11-12 NOTE — ASSESSMENT & PLAN NOTE
Following with cardiology with Dr. Dubois with [facility]. Yearly follow up. Has appointment in December.

## 2024-11-12 NOTE — PATIENT INSTRUCTIONS
Thank you for coming in for your Medicare wellness examination.    We will go ahead and give you your flu shot today here in the office.    You are due for colonoscopy and I went ahead and placed a referral for gastroenterology.  Will fax this over to the GI office exam at [facility].  It looks like this is where you have been seen previously.  I would expect them to reach out to you within the next 1 or 2 weeks.  Please call us back if they do not do that within this timeframe.    I would definitely recommend additional immunizations before you end up going to your trip in January.  I would recommend getting the updated COVID immunization as well as the Prevnar 20 vaccine and the RSV vaccine.  You can talk with your pharmacy about the optimal timing and some of these can be given at the same time.  We may be able to do the Prevnar 20 in the office but you would have to get the RSV and COVID vaccines at the pharmacy.  I would eventually recommend the shingles vaccine but you can do that at a later point.    We did discuss diet and I would recommend cutting out any added sugars in the diet especially anything that you are drinking.  Try to add on additional exercise with walking or anything else that you are able to add in the meantime.  Please get that follow-up blood test in March when you are back from vacation.    Please keep your regular appointment with cardiology.    Please keep track of blood pressures at home.  It sounds like your blood pressure is well-controlled at home but elevated here in the office.  You can always come in for a nurse visit blood pressure check.  If you do, bring your log of blood pressures and cuff with you as well to get checked in the office.    We can continue with yearly follow-up.    Please come in sooner with any other acute concerns or complaints.    Thank you

## 2024-11-12 NOTE — PROGRESS NOTES
Subjective   Reason for Visit: Sridhar Rodriges is an 73 y.o. male here for a Medicare Wellness visit.     Past Medical, Surgical, and Family History reviewed and updated in chart.    Reviewed all medications by prescribing practitioner or clinical pharmacist (such as prescriptions, OTCs, herbal therapies and supplements) and documented in the medical record.    HPI    No acute concerns or complaints today.    Following with cardiology with Dr. Dubois with [facility]. They are keeping an eye on the aneurysm.     Blood pressures at home always in the 130s/80s at home.    Dental: Once yearly.  Vision: Wears glasses. Regular eye exams.    Last Colonoscopy: Due at this time. Referral placed to GI  AAA Screening: Never smoked  Low Dose Lung CT Screening: Never smoked    Influenza: Getting done today.  Tetanus: 2020  Pneumonia: Due for Prevnar 20. Recommended  COVID: Recommended updated vaccine.  Shingles: Recommended  RSV: Recommended      Patient Care Team:  Juan Martinez DO as PCP - General  Juan Martinez DO as PCP - [employer]vantage PCP  Betsy Dubois MD as Consulting Physician (Cardiology)     Review of Systems   Constitutional:  Negative for chills, fatigue and fever.   HENT:  Negative for congestion, rhinorrhea and sore throat.    Eyes:  Negative for visual disturbance.   Respiratory:  Negative for cough and shortness of breath.    Cardiovascular:  Negative for chest pain and palpitations.   Gastrointestinal:  Negative for abdominal pain, constipation, diarrhea, nausea and vomiting.   Genitourinary:  Negative for dysuria and frequency.   Musculoskeletal:  Negative for arthralgias and myalgias.   Skin:  Negative for color change and rash.   Neurological:  Negative for dizziness, light-headedness and numbness.   Psychiatric/Behavioral:  Negative for dysphoric mood. The patient is not nervous/anxious.        Objective   Vitals:  BP (!) 160/92   Pulse 68   Temp 36.4 °C (97.6 °F)   Ht 1.715 m (5'  "7.5\")   Wt 116 kg (255 lb 6.4 oz)   BMI 39.41 kg/m²       Physical Exam  Vitals and nursing note reviewed.   Constitutional:       General: He is not in acute distress.     Appearance: Normal appearance. He is obese. He is not ill-appearing or toxic-appearing.   HENT:      Head: Normocephalic and atraumatic.      Right Ear: Tympanic membrane, ear canal and external ear normal.      Left Ear: Tympanic membrane, ear canal and external ear normal.      Nose: Nose normal.      Mouth/Throat:      Mouth: Mucous membranes are moist.      Pharynx: Oropharynx is clear.   Eyes:      Extraocular Movements: Extraocular movements intact.      Conjunctiva/sclera: Conjunctivae normal.      Pupils: Pupils are equal, round, and reactive to light.   Cardiovascular:      Rate and Rhythm: Normal rate and regular rhythm.      Pulses: Normal pulses.      Heart sounds: Normal heart sounds.   Pulmonary:      Effort: Pulmonary effort is normal.      Breath sounds: Normal breath sounds.   Abdominal:      General: Bowel sounds are normal.      Palpations: Abdomen is soft.   Musculoskeletal:         General: Normal range of motion.      Cervical back: Normal range of motion and neck supple.   Skin:     General: Skin is warm and dry.   Neurological:      General: No focal deficit present.      Mental Status: He is alert and oriented to person, place, and time. Mental status is at baseline.      Cranial Nerves: No cranial nerve deficit.      Sensory: No sensory deficit.      Motor: No weakness.   Psychiatric:         Mood and Affect: Mood normal.         Behavior: Behavior normal.         Thought Content: Thought content normal.         Judgment: Judgment normal.       The 10-year ASCVD risk score (Clint DK, et al., 2019) is: 34.6%    Values used to calculate the score:      Age: 73 years      Sex: Male      Is Non- : No      Diabetic: No      Tobacco smoker: No      Systolic Blood Pressure: 160 mmHg      Is BP " treated: Yes      HDL Cholesterol: 40.9 mg/dL      Total Cholesterol: 130 mg/dL  Time spent was 15 mins reviewing    Assessment & Plan  Medicare annual wellness visit, subsequent         Advance directive discussed with patient         Cardiac risk counseling         [place] coronary artery calcium score greater than 400  3/28/2022 score over 1000 and following with cardiology since that time. Negative Nuclear stress test.         Aneurysm of ascending aorta without rupture (CMS-HCC)  Following with cardiology with Dr. Dubois with [facility]. Yearly follow up. Has appointment in December.         Coronary artery disease involving native heart without angina pectoris, unspecified vessel or lesion type         Primary hypertension  Chronic. Stable. Blood pressures normal at home and elevated in the office. On amlodipine and losartan.         Mixed hyperlipidemia  Chronic. Stable. On Atorvastatin.         Type 2 diabetes mellitus with hyperglycemia, without long-term current use of insulin    Orders:    Hemoglobin A1C; Future    BMI 39.0-39.9,adult         Erectile dysfunction, unspecified erectile dysfunction type  Chronic. Stable. On Sildenafil as needed.         Liver cyst  Stable since 2017 on CT angio test from 05/2023.         Lung nodules  Never smoker. No new changes in CT angio chest from 05/2023.         Routine general medical examination at health care facility    Orders:    1 Year Follow Up In [facility] - PCP - Wellness Exam; Future    Need for vaccination    Orders:    Flu vaccine, high dose    Screening for colorectal cancer    Orders:    Referral to Gastroenterology; Future           Advance Directives Discussion  16 - 20 minutes were spent discussing Advanced Care Planning (including a Living Will, Medical Power Of , as well as specific end of life choices and/or directives). The details of that discussion were documented in Advanced Directives Discussion section of the  medical record.     Cardiac Risk Assessment  15 - 20 minutes were spent discussing Cardiovascular risk and, if needed, lifestyle modifications were recommended, including nutritional choices, exercise, and elimination of habits contributing to risk.   Aspirin use/disuse was discussed following the guidelines below:  low dose ASA ( mg) should be considered:    If prior Heart Attack/Stroke/Peripheral vascular disease:  Generally recommend daily low dose aspirin unless extremely high bleeding risk (e.g., gastrointestinal).    If no prior Heart Attack/Stroke/Peripheral vascular disease:              Age over 70: Do not use Aspirin for prevention    Age less than 70 and 10-year cardiovascular disease risk is >20%: use low dose Aspirin for prevention.

## 2024-11-17 PROBLEM — H61.23 IMPACTED CERUMEN OF BOTH EARS: Status: RESOLVED | Noted: 2023-02-07 | Resolved: 2024-11-17

## 2024-12-02 PROBLEM — I35.0 AORTIC VALVE STENOSIS: Status: ACTIVE | Noted: 2024-12-02

## 2024-12-06 DIAGNOSIS — F41.9 ANXIETY: ICD-10-CM

## 2024-12-07 DIAGNOSIS — I10 PRIMARY HYPERTENSION: ICD-10-CM

## 2024-12-08 RX ORDER — QUETIAPINE FUMARATE 25 MG/1
25 TABLET, FILM COATED ORAL NIGHTLY
Qty: 90 TABLET | Refills: 0 | Status: SHIPPED | OUTPATIENT
Start: 2024-12-08

## 2024-12-10 RX ORDER — LOSARTAN POTASSIUM 100 MG/1
100 TABLET ORAL DAILY
Qty: 90 TABLET | Refills: 1 | Status: SHIPPED | OUTPATIENT
Start: 2024-12-10

## 2024-12-18 PROBLEM — R93.1 AGATSTON CORONARY ARTERY CALCIUM SCORE GREATER THAN 400: Status: RESOLVED | Noted: 2023-02-07 | Resolved: 2024-12-18

## 2024-12-18 PROBLEM — N28.1 CYST OF RIGHT KIDNEY: Status: RESOLVED | Noted: 2023-02-07 | Resolved: 2024-12-18

## 2024-12-18 PROBLEM — Z12.11 ENCOUNTER FOR COLORECTAL CANCER SCREENING: Status: RESOLVED | Noted: 2023-04-06 | Resolved: 2024-12-18

## 2024-12-18 PROBLEM — E11.65 TYPE 2 DIABETES MELLITUS WITH HYPERGLYCEMIA, WITHOUT LONG-TERM CURRENT USE OF INSULIN: Chronic | Status: ACTIVE | Noted: 2024-11-12

## 2024-12-18 PROBLEM — Z00.00 MEDICARE ANNUAL WELLNESS VISIT, SUBSEQUENT: Status: RESOLVED | Noted: 2023-04-06 | Resolved: 2024-12-18

## 2024-12-18 PROBLEM — I25.10 ARTERIOSCLEROSIS OF CORONARY ARTERY: Chronic | Status: ACTIVE | Noted: 2023-02-07

## 2024-12-18 PROBLEM — I71.21 THORACIC ASCENDING AORTIC ANEURYSM (CMS-HCC): Chronic | Status: ACTIVE | Noted: 2023-02-07

## 2024-12-18 PROBLEM — Z12.12 ENCOUNTER FOR COLORECTAL CANCER SCREENING: Status: RESOLVED | Noted: 2023-04-06 | Resolved: 2024-12-18

## 2024-12-18 NOTE — PROGRESS NOTES
Referred by No ref. provider found    HPI I am seeing Sean for the first time in a year and a half.  Feeling well.  Walking most days.  No chest pain or pressure no shortness of breath no palpitations.  Blood pressures at home are typically much better than they are here.    Past Medical History:  Problem List Items Addressed This Visit    None     Past Medical History:   Diagnosis Date    Cough, unspecified 10/08/2016    Cough    Encounter for other preprocedural examination 09/02/2015    Preop examination    Increased prostate specific antigen (PSA) velocity 02/07/2023    Personal history of other diseases of the respiratory system 03/17/2014    History of acute bronchitis    Prediabetes 02/07/2023    Strain of right Achilles tendon, initial encounter 10/20/2016    Achilles rupture, right      Past Surgical History:  He has a past surgical history that includes Tonsillectomy (08/21/2015); Rotator cuff repair (08/21/2015); Knee arthroscopy w/ debridement (04/05/2018); and Other surgical history (10/20/2016).      Social History:  He reports that he has never smoked. He has never been exposed to tobacco smoke. He has never used smokeless tobacco. He reports current alcohol use of about 2.0 standard drinks of alcohol per week. He reports that he does not use drugs.    Family History:  Family History   Problem Relation Name Age of Onset    Diabetes Mother      Alzheimer's disease Mother      Coronary artery disease Father      Diabetes Brother       Allergies:  Patient has no known allergies.    Outpatient Medications:  Current Outpatient Medications   Medication Instructions    amLODIPine (NORVASC) 5 mg, oral, Daily    aspirin 81 mg EC tablet 1 tablet, Daily    atorvastatin (LIPITOR) 40 mg, oral, Nightly    losartan (COZAAR) 100 mg, oral, Daily    mv-mn-iron-FA-herbal cmplx#190 (Vitamin D3 Complete) 18 mg iron-800 mcg-150 mg tablet 1 tablet, Daily    QUEtiapine (SEROQUEL) 25 mg, oral, Nightly    sildenafil (VIAGRA)  100 mg, oral, As needed     Last Recorded Vitals:  There were no vitals filed for this visit.    Physical Exam  Patient is alert and oriented x3.  HEENT is unremarkable mucous members are moist  Neck no JVP no bruits upstrokes are full no thyromegaly  Lungs are clear bilaterally.  No wheezing crackles or rales  Heart regular rhythm normal S1-S2 there is no S3 soft systolic ejection murmur abdomen is soft bs are positive nontender nondistended no organomegaly no pulsatile masses  Extremities have no edema.  Distal pulses present palpable.  Neuro is grossly nonfocal  Skin has no rashes     Last Labs:  CBC -  Lab Results   Component Value Date    WBC 7.4 11/08/2024    HGB 14.7 11/08/2024    HCT 45.6 11/08/2024    MCV 93 11/08/2024     11/08/2024     CMP -  Lab Results   Component Value Date    CALCIUM 9.3 11/08/2024    PROT 6.6 11/08/2024    ALBUMIN 4.4 11/08/2024    AST 17 11/08/2024    ALT 19 11/08/2024    ALKPHOS 100 11/08/2024    BILITOT 1.5 (H) 11/08/2024     LIPID PANEL -   Lab Results   Component Value Date    CHOL 130 11/08/2024    HDL 40.9 11/08/2024    CHHDL 3.2 11/08/2024    VLDL 22 11/08/2024    TRIG 111 11/08/2024    NHDL 89 11/08/2024     RENAL FUNCTION PANEL -   Lab Results   Component Value Date    K 4.4 11/08/2024     Lab Results   Component Value Date    HGBA1C 6.5 (H) 11/08/2024          Procedure  CTA [05/16/2023]: Borderline dial mid asc aorta @ 4.0 cm (unchanged compared to prior study). Rest of TA normal in course / caliber. No ev/for acute aortic pathology such as dissection / intramural hematoma / contained rupture. Severe coronary artery calcifications noted, correlating to prior CT cardiac scoring 03/28/2022. Mild calcification of aortic valve. Scattered small hypodense lesions are identified in liver, incompletely characterized grossly stable since 2017 and likely represent small cysts or hemangiomas.     ECHO [05/02/2023]: EF 60-65%. SD = impaired relaxation pattern. Mild AS.     EX  NST [07/27/2022]: 10 min 55 sec (13.20 METs) . . . Prob normal with ev/for diaphragmatic att art. Small prior posterior wall MI w/o residual wilmar-infarct ischemia cannot be excluded but less likely in view of normal contractility. Normal segmental function on gated images w/ex EF 57%.     CT / SCORING [03/28/2022] = 1012.8 (LM 0, .29, LCx 8.9, .61). Ectatic ascending thoracic aorta @ 3.9 cm. Chronic findings as per report.     Assessment/Plan   1. CAD. Markedly elevated calcium score 1013 units the vast majority of which is located in the LAD. Stress testing was normal with excellent functional capacity for age.  I have asked for him to have a repeat exercise nuclear stress test in June or July 2025.  Continue aspirin losartan and atorvastatin.    2. Hyperlipidemia.  11/8/2024 LDL 67 HDL 41 triglycerides 111 LFTs normal.  While these numbers are good, they are not ideal.  I am asking for him to increase his atorvastatin to 80 mg with a target LDL of less than 55.  Fasting blood work will be drawn sometime in May or June prior to his CAT scan    3. Hypertension.  Blood pressures here have been high but blood pressures at home are actually better controlled.  He still needs to bring his cuff in so that we can make sure it is reading accurately.  Increasing his amlodipine would be reasonable.  He has developed hyperglycemia with hydrochlorothiazide in the past.     4. Thoracic aortic aneurysm. In 2017 it measured 4.2 cm. On the calcium scoring CT scan measured 3.9 cm.  Last CT May 2023 revealed the aneurysm was 4 cm.  A repeat echo will be done June 2025 along with a CTA.      5. Aortic stenosis. Mild.  Echo June 2025     EKG today.  Increase atorvastatin to 80 mg.  Echo, exercise nuclear stress test May or June 2025.  CTA chest June 2025.  Fasting blood work in May or June 2025.  Betsy Dubois MD     Instructions and follow up

## 2024-12-19 ENCOUNTER — OFFICE VISIT (OUTPATIENT)
Dept: CARDIOLOGY | Facility: CLINIC | Age: 74
End: 2024-12-19
Payer: MEDICARE

## 2024-12-19 VITALS
HEIGHT: 69 IN | HEART RATE: 79 BPM | OXYGEN SATURATION: 98 % | BODY MASS INDEX: 37.47 KG/M2 | WEIGHT: 253 LBS | DIASTOLIC BLOOD PRESSURE: 88 MMHG | SYSTOLIC BLOOD PRESSURE: 173 MMHG

## 2024-12-19 DIAGNOSIS — I10 PRIMARY HYPERTENSION: ICD-10-CM

## 2024-12-19 DIAGNOSIS — I25.10 ARTERIOSCLEROSIS OF CORONARY ARTERY: ICD-10-CM

## 2024-12-19 DIAGNOSIS — E78.2 MIXED HYPERLIPIDEMIA: ICD-10-CM

## 2024-12-19 DIAGNOSIS — I35.0 NONRHEUMATIC AORTIC VALVE STENOSIS: Primary | ICD-10-CM

## 2024-12-19 DIAGNOSIS — I71.21 ANEURYSM OF ASCENDING AORTA WITHOUT RUPTURE (CMS-HCC): ICD-10-CM

## 2024-12-19 DIAGNOSIS — E78.5 HYPERLIPIDEMIA, UNSPECIFIED: ICD-10-CM

## 2024-12-19 PROBLEM — E66.812 CLASS 2 OBESITY WITH BODY MASS INDEX (BMI) OF 39.0 TO 39.9 IN ADULT: Status: ACTIVE | Noted: 2024-11-12

## 2024-12-19 PROCEDURE — 1159F MED LIST DOCD IN RCRD: CPT | Performed by: INTERNAL MEDICINE

## 2024-12-19 PROCEDURE — 1036F TOBACCO NON-USER: CPT | Performed by: INTERNAL MEDICINE

## 2024-12-19 PROCEDURE — 3062F POS MACROALBUMINURIA REV: CPT | Performed by: INTERNAL MEDICINE

## 2024-12-19 PROCEDURE — 3048F LDL-C <100 MG/DL: CPT | Performed by: INTERNAL MEDICINE

## 2024-12-19 PROCEDURE — 3077F SYST BP >= 140 MM HG: CPT | Performed by: INTERNAL MEDICINE

## 2024-12-19 PROCEDURE — 3044F HG A1C LEVEL LT 7.0%: CPT | Performed by: INTERNAL MEDICINE

## 2024-12-19 PROCEDURE — 93005 ELECTROCARDIOGRAM TRACING: CPT | Performed by: INTERNAL MEDICINE

## 2024-12-19 PROCEDURE — 99214 OFFICE O/P EST MOD 30 MIN: CPT | Performed by: INTERNAL MEDICINE

## 2024-12-19 PROCEDURE — 4010F ACE/ARB THERAPY RXD/TAKEN: CPT | Performed by: INTERNAL MEDICINE

## 2024-12-19 PROCEDURE — 1160F RVW MEDS BY RX/DR IN RCRD: CPT | Performed by: INTERNAL MEDICINE

## 2024-12-19 PROCEDURE — 3008F BODY MASS INDEX DOCD: CPT | Performed by: INTERNAL MEDICINE

## 2024-12-19 PROCEDURE — 3079F DIAST BP 80-89 MM HG: CPT | Performed by: INTERNAL MEDICINE

## 2024-12-19 PROCEDURE — 1123F ACP DISCUSS/DSCN MKR DOCD: CPT | Performed by: INTERNAL MEDICINE

## 2024-12-19 RX ORDER — ATORVASTATIN CALCIUM 80 MG/1
80 TABLET, FILM COATED ORAL NIGHTLY
Qty: 90 TABLET | Refills: 3 | Status: SHIPPED | OUTPATIENT
Start: 2024-12-19 | End: 2025-12-19

## 2024-12-19 NOTE — PATIENT INSTRUCTIONS
1. CAD. Markedly elevated calcium score 1013 units the vast majority of which is located in the LAD. Stress testing was normal with excellent functional capacity for age.  I have asked for him to have a repeat exercise nuclear stress test in June or July 2025.  Continue aspirin losartan and atorvastatin.    2. Hyperlipidemia.  11/8/2024 LDL 67 HDL 41 triglycerides 111 LFTs normal.  While these numbers are good, they are not ideal.  I am asking for him to increase his atorvastatin to 80 mg with a target LDL of less than 55.  Fasting blood work will be drawn sometime in May or June prior to his CAT scan    3. Hypertension.  Blood pressures here have been high but blood pressures at home are actually better controlled.  He still needs to bring his cuff in so that we can make sure it is reading accurately.  Increasing his amlodipine would be reasonable.  He has developed hyperglycemia with hydrochlorothiazide in the past.     4. Thoracic aortic aneurysm. In 2017 it measured 4.2 cm. On the calcium scoring CT scan measured 3.9 cm.  Last CT May 2023 revealed the aneurysm was 4 cm.  A repeat echo will be done June 2025 along with a CTA.      5. Aortic stenosis. Mild.  Echo June 2025     EKG today.  Increase atorvastatin to 80 mg.  Echo, exercise nuclear stress test May or June 2025.  CTA chest June 2025.  Fasting blood work in May or June 2025.

## 2024-12-23 LAB
ATRIAL RATE: 71 BPM
P AXIS: 53 DEGREES
P OFFSET: 187 MS
P ONSET: 124 MS
PR INTERVAL: 194 MS
Q ONSET: 221 MS
QRS COUNT: 12 BEATS
QRS DURATION: 100 MS
QT INTERVAL: 364 MS
QTC CALCULATION(BAZETT): 395 MS
QTC FREDERICIA: 385 MS
R AXIS: 38 DEGREES
T AXIS: 21 DEGREES
T OFFSET: 403 MS
VENTRICULAR RATE: 71 BPM

## 2025-01-20 ENCOUNTER — APPOINTMENT (OUTPATIENT)
Dept: CARDIOLOGY | Facility: CLINIC | Age: 75
End: 2025-01-20
Payer: MEDICARE

## 2025-01-27 ENCOUNTER — APPOINTMENT (OUTPATIENT)
Dept: CARDIOLOGY | Facility: CLINIC | Age: 75
End: 2025-01-27
Payer: MEDICARE

## 2025-03-04 DIAGNOSIS — F41.9 ANXIETY: ICD-10-CM

## 2025-03-05 RX ORDER — QUETIAPINE FUMARATE 25 MG/1
25 TABLET, FILM COATED ORAL NIGHTLY
Qty: 90 TABLET | Refills: 0 | Status: SHIPPED | OUTPATIENT
Start: 2025-03-05

## 2025-03-29 NOTE — ASSESSMENT & PLAN NOTE
Blood pressure elevated today in the office.  Reports that with his home blood pressure cuff he is in the 130s over 80s.  Doing well on the amlodipine he started about a week ago.  We will follow-up for nurse visit blood pressure check in 2 to 3 weeks after his trip to [place].  He will bring a log of his pressures and home blood pressure cuff with him.  
Blood work ordered to be done including A1c.  
Colonoscopy ordered.  He will call if he does not receive a call to be scheduled within the next week.  He thinks he will probably have to do it over the summer after completing testing for his cardiologist.  
Discussed healthy diet and exercise.  Blood work ordered.  
History of increased PSA level.  Repeat level ordered.  
Patient is following with cardiology.  He has an appointment for upcoming testing in May and follow-up in July.  
Screening questions completed.  No acute concerns or complaints today.  He follows regularly with cardiology and has upcoming testing scheduled.  Reordered colonoscopy.  reviewed recent fasting lab work from cardiology.  Ordered additional lab work to be done.  
98.1

## 2025-05-21 ENCOUNTER — HOSPITAL ENCOUNTER (OUTPATIENT)
Dept: CARDIOLOGY | Facility: CLINIC | Age: 75
Discharge: HOME | End: 2025-05-21
Payer: MEDICARE

## 2025-05-21 DIAGNOSIS — I71.21 ANEURYSM OF ASCENDING AORTA WITHOUT RUPTURE: ICD-10-CM

## 2025-05-21 DIAGNOSIS — I25.10 ARTERIOSCLEROSIS OF CORONARY ARTERY: ICD-10-CM

## 2025-05-21 DIAGNOSIS — I35.0 NONRHEUMATIC AORTIC VALVE STENOSIS: ICD-10-CM

## 2025-05-21 LAB
AORTIC VALVE MEAN GRADIENT: 9 MMHG
AORTIC VALVE PEAK VELOCITY: 2.23 M/S
AV PEAK GRADIENT: 20 MMHG
AVA (PEAK VEL): 2.24 CM2
AVA (VTI): 2.76 CM2
EJECTION FRACTION APICAL 4 CHAMBER: 58
EJECTION FRACTION: 60 %
LEFT ATRIUM VOLUME AREA LENGTH INDEX BSA: 24.6 ML/M2
LEFT VENTRICLE INTERNAL DIMENSION DIASTOLE: 4.9 CM (ref 3.5–6)
LEFT VENTRICULAR OUTFLOW TRACT DIAMETER: 2.16 CM
MITRAL VALVE E/A RATIO: 0.62
RIGHT VENTRICLE FREE WALL PEAK S': 23 CM/S
TRICUSPID ANNULAR PLANE SYSTOLIC EXCURSION: 2.3 CM

## 2025-05-21 PROCEDURE — 93306 TTE W/DOPPLER COMPLETE: CPT

## 2025-05-21 PROCEDURE — 93306 TTE W/DOPPLER COMPLETE: CPT | Performed by: INTERNAL MEDICINE

## 2025-06-04 DIAGNOSIS — F41.9 ANXIETY: ICD-10-CM

## 2025-06-04 DIAGNOSIS — I10 PRIMARY HYPERTENSION: ICD-10-CM

## 2025-06-04 RX ORDER — QUETIAPINE FUMARATE 25 MG/1
25 TABLET, FILM COATED ORAL NIGHTLY
Qty: 90 TABLET | Refills: 1 | Status: SHIPPED | OUTPATIENT
Start: 2025-06-04

## 2025-06-04 RX ORDER — LOSARTAN POTASSIUM 100 MG/1
100 TABLET ORAL DAILY
Qty: 90 TABLET | Refills: 1 | Status: SHIPPED | OUTPATIENT
Start: 2025-06-04

## 2025-06-19 ENCOUNTER — APPOINTMENT (OUTPATIENT)
Dept: CARDIOLOGY | Facility: CLINIC | Age: 75
End: 2025-06-19
Payer: MEDICARE

## 2025-06-23 ENCOUNTER — APPOINTMENT (OUTPATIENT)
Dept: RADIOLOGY | Facility: CLINIC | Age: 75
End: 2025-06-23
Payer: MEDICARE

## 2025-06-26 LAB
ALBUMIN SERPL-MCNC: 4.3 G/DL (ref 3.6–5.1)
ALP SERPL-CCNC: 104 U/L (ref 35–144)
ALT SERPL-CCNC: 17 U/L (ref 9–46)
ANION GAP SERPL CALCULATED.4IONS-SCNC: 8 MMOL/L (CALC) (ref 7–17)
AST SERPL-CCNC: 17 U/L (ref 10–35)
BILIRUB SERPL-MCNC: 1.3 MG/DL (ref 0.2–1.2)
BUN SERPL-MCNC: 15 MG/DL (ref 7–25)
CALCIUM SERPL-MCNC: 9.1 MG/DL (ref 8.6–10.3)
CHLORIDE SERPL-SCNC: 104 MMOL/L (ref 98–110)
CHOLEST SERPL-MCNC: 113 MG/DL
CHOLEST/HDLC SERPL: 2.8 (CALC)
CO2 SERPL-SCNC: 29 MMOL/L (ref 20–32)
CREAT SERPL-MCNC: 0.72 MG/DL (ref 0.7–1.28)
EGFRCR SERPLBLD CKD-EPI 2021: 95 ML/MIN/1.73M2
GLUCOSE SERPL-MCNC: 126 MG/DL (ref 65–99)
HDLC SERPL-MCNC: 40 MG/DL
LDLC SERPL CALC-MCNC: 55 MG/DL (CALC)
NONHDLC SERPL-MCNC: 73 MG/DL (CALC)
POTASSIUM SERPL-SCNC: 4.3 MMOL/L (ref 3.5–5.3)
PROT SERPL-MCNC: 6.5 G/DL (ref 6.1–8.1)
SODIUM SERPL-SCNC: 141 MMOL/L (ref 135–146)
TRIGL SERPL-MCNC: 101 MG/DL

## 2025-06-30 ENCOUNTER — APPOINTMENT (OUTPATIENT)
Dept: RADIOLOGY | Facility: CLINIC | Age: 75
End: 2025-06-30
Payer: MEDICARE

## 2025-06-30 DIAGNOSIS — I71.21 ANEURYSM OF ASCENDING AORTA WITHOUT RUPTURE: ICD-10-CM

## 2025-06-30 DIAGNOSIS — I35.0 NONRHEUMATIC AORTIC VALVE STENOSIS: ICD-10-CM

## 2025-06-30 DIAGNOSIS — I25.10 ARTERIOSCLEROSIS OF CORONARY ARTERY: ICD-10-CM

## 2025-06-30 PROCEDURE — 2550000001 HC RX 255 CONTRASTS: Performed by: INTERNAL MEDICINE

## 2025-06-30 PROCEDURE — 71275 CT ANGIOGRAPHY CHEST: CPT

## 2025-06-30 PROCEDURE — 71275 CT ANGIOGRAPHY CHEST: CPT | Performed by: STUDENT IN AN ORGANIZED HEALTH CARE EDUCATION/TRAINING PROGRAM

## 2025-06-30 RX ADMIN — IOHEXOL 75 ML: 350 INJECTION, SOLUTION INTRAVENOUS at 08:58

## 2025-07-01 ENCOUNTER — APPOINTMENT (OUTPATIENT)
Dept: RADIOLOGY | Facility: CLINIC | Age: 75
End: 2025-07-01
Payer: MEDICARE

## 2025-07-01 ENCOUNTER — APPOINTMENT (OUTPATIENT)
Dept: CARDIOLOGY | Facility: CLINIC | Age: 75
End: 2025-07-01
Payer: MEDICARE

## 2025-07-16 PROBLEM — G47.00 INSOMNIA: Status: RESOLVED | Noted: 2023-02-07 | Resolved: 2025-07-16

## 2025-07-16 PROBLEM — F41.9 ANXIETY: Status: RESOLVED | Noted: 2023-02-07 | Resolved: 2025-07-16

## 2025-07-23 ENCOUNTER — HOSPITAL ENCOUNTER (OUTPATIENT)
Dept: RADIOLOGY | Facility: HOSPITAL | Age: 75
Discharge: HOME | End: 2025-07-23
Payer: MEDICARE

## 2025-07-23 ENCOUNTER — HOSPITAL ENCOUNTER (OUTPATIENT)
Dept: CARDIOLOGY | Facility: HOSPITAL | Age: 75
Discharge: HOME | End: 2025-07-23
Payer: MEDICARE

## 2025-07-23 DIAGNOSIS — I71.21 ANEURYSM OF ASCENDING AORTA WITHOUT RUPTURE: ICD-10-CM

## 2025-07-23 DIAGNOSIS — I35.0 NONRHEUMATIC AORTIC VALVE STENOSIS: ICD-10-CM

## 2025-07-23 DIAGNOSIS — I71.20 THORACIC AORTIC ANEURYSM WITHOUT RUPTURE: ICD-10-CM

## 2025-07-23 DIAGNOSIS — I25.10 ARTERIOSCLEROSIS OF CORONARY ARTERY: ICD-10-CM

## 2025-07-23 PROCEDURE — A9502 TC99M TETROFOSMIN: HCPCS | Performed by: INTERNAL MEDICINE

## 2025-07-23 PROCEDURE — 93018 CV STRESS TEST I&R ONLY: CPT | Performed by: INTERNAL MEDICINE

## 2025-07-23 PROCEDURE — 3430000001 HC RX 343 DIAGNOSTIC RADIOPHARMACEUTICALS: Performed by: INTERNAL MEDICINE

## 2025-07-23 PROCEDURE — 93016 CV STRESS TEST SUPVJ ONLY: CPT | Performed by: INTERNAL MEDICINE

## 2025-07-23 PROCEDURE — 78452 HT MUSCLE IMAGE SPECT MULT: CPT

## 2025-07-23 PROCEDURE — 78452 HT MUSCLE IMAGE SPECT MULT: CPT | Performed by: INTERNAL MEDICINE

## 2025-07-23 PROCEDURE — 93017 CV STRESS TEST TRACING ONLY: CPT

## 2025-07-23 RX ADMIN — TETROFOSMIN 10.7 MILLICURIE: 0.23 INJECTION, POWDER, LYOPHILIZED, FOR SOLUTION INTRAVENOUS at 09:40

## 2025-07-23 RX ADMIN — TETROFOSMIN 36 MILLICURIE: 0.23 INJECTION, POWDER, LYOPHILIZED, FOR SOLUTION INTRAVENOUS at 11:05

## 2025-08-10 NOTE — PROGRESS NOTES
Referred by No ref. provider found    HPI I am seeing Sean for the first time in a year and a half.  Feeling well.  Walking most days.  No chest pain or pressure no shortness of breath no palpitations.  Blood pressures at home are typically much better than they are here.    Past Medical History:  Problem List Items Addressed This Visit    None     Past Medical History:   Diagnosis Date    Arteriosclerosis of coronary artery 02/07/2023    Comment on above: Elevated CAC 1013 Agatston units;      Cough, unspecified 10/08/2016    Cough    Encounter for other preprocedural examination 09/02/2015    Preop examination    Increased prostate specific antigen (PSA) velocity 02/07/2023    Personal history of other diseases of the respiratory system 03/17/2014    History of acute bronchitis    Prediabetes 02/07/2023    Strain of right Achilles tendon, initial encounter 10/20/2016    Achilles rupture, right    Thoracic ascending aortic aneurysm 02/07/2023    Following with cardiology with Dr. Dubois with [facility].      Type 2 diabetes mellitus with hyperglycemia, without long-term current use of insulin 11/12/2024      Past Surgical History:  He has a past surgical history that includes Tonsillectomy (08/21/2015); Rotator cuff repair (08/21/2015); Knee arthroscopy w/ debridement (04/05/2018); and Other surgical history (10/20/2016).      Social History:  He reports that he has never smoked. He has never been exposed to tobacco smoke. He has never used smokeless tobacco. He reports current alcohol use of about 2.0 standard drinks of alcohol per week. He reports that he does not use drugs.    Family History:  Family History   Problem Relation Name Age of Onset    Diabetes Mother      Alzheimer's disease Mother      Coronary artery disease Father      Diabetes Brother       Allergies:  Patient has no known allergies.    Outpatient Medications:  Current Outpatient Medications   Medication Instructions    amLODIPine  (NORVASC) 5 mg, oral, Daily    aspirin 81 mg EC tablet 1 tablet, Daily    atorvastatin (LIPITOR) 80 mg, oral, Nightly    losartan (COZAAR) 100 mg, oral, Daily    mv-mn-iron-FA-herbal cmplx#190 (Vitamin D3 Complete) 18 mg iron-800 mcg-150 mg tablet 1 tablet, Daily    QUEtiapine (SEROQUEL) 25 mg, oral, Nightly    sildenafil (VIAGRA) 100 mg, oral, As needed     Last Recorded Vitals:  There were no vitals filed for this visit.    Physical Exam  Patient is alert and oriented x3.  HEENT is unremarkable mucous members are moist  Neck no JVP no bruits upstrokes are full no thyromegaly  Lungs are clear bilaterally.  No wheezing crackles or rales  Heart regular rhythm normal S1-S2 there is no S3 soft systolic ejection murmur abdomen is soft bs are positive nontender nondistended no organomegaly no pulsatile masses  Extremities have no edema.  Distal pulses present palpable.  Neuro is grossly nonfocal  Skin has no rashes     Last Labs:  CBC -  Lab Results   Component Value Date    WBC 7.4 11/08/2024    HGB 14.7 11/08/2024    HCT 45.6 11/08/2024    MCV 93 11/08/2024     11/08/2024     CMP -  Lab Results   Component Value Date    CALCIUM 9.1 06/26/2025    PROT 6.5 06/26/2025    ALBUMIN 4.3 06/26/2025    AST 17 06/26/2025    ALT 17 06/26/2025    ALKPHOS 104 06/26/2025    BILITOT 1.3 (H) 06/26/2025     LIPID PANEL -   Lab Results   Component Value Date    CHOL 113 06/26/2025    HDL 40 06/26/2025    CHHDL 2.8 06/26/2025    VLDL 22 11/08/2024    TRIG 101 06/26/2025    NHDL 73 06/26/2025     RENAL FUNCTION PANEL -   Lab Results   Component Value Date    K 4.3 06/26/2025     Lab Results   Component Value Date    HGBA1C 6.5 (H) 11/08/2024          Procedure    TST 7/24/2025 negative EKG above average functional capacity for age normal perfusion EF 55%    CTA chest 6/30/2025 TAA 4 cm    TTE 5/21/2025 EF 60%, DD EF, mild aortic stenosis, lipomatous hypertrophy    CTA [05/16/2023]: Borderline dial mid asc aorta @ 4.0 cm (unchanged  compared to prior study). Rest of TA normal in course / caliber. No ev/for acute aortic pathology such as dissection / intramural hematoma / contained rupture. Severe coronary artery calcifications noted, correlating to prior CT cardiac scoring 03/28/2022. Mild calcification of aortic valve. Scattered small hypodense lesions are identified in liver, incompletely characterized grossly stable since 2017 and likely represent small cysts or hemangiomas.     ECHO [05/02/2023]: EF 60-65%. SD = impaired relaxation pattern. Mild AS.     EX NST [07/27/2022]: 10 min 55 sec (13.20 METs) . . . Prob normal with ev/for diaphragmatic att art. Small prior posterior wall MI w/o residual wilmar-infarct ischemia cannot be excluded but less likely in view of normal contractility. Normal segmental function on gated images w/ex EF 57%.     CT / SCORING [03/28/2022] = 1012.8 (LM 0, .29, LCx 8.9, .61). Ectatic ascending thoracic aorta @ 3.9 cm. Chronic findings as per report.     Assessment/Plan   1. CAD. Markedly elevated calcium score 1013 units the vast majority of which is located in the LAD. Stress testing was normal with excellent functional capacity for age.  TST 7/24/2025 negative EKG above average functional capacity for age normal perfusion EF 55%    2. Hyperlipidemia.  11/8/2024 LDL 67 HDL 41 triglycerides 111 LFTs normal.  While these numbers are good, they are not ideal.  I am asking for him to increase his atorvastatin to 80 mg with a target LDL of less than 55.  6/26/2025 LDL 55 HDL 40 triglycerides 101 LFTs normal    3. Hypertension.  Blood pressures here have been high but blood pressures at home are actually better controlled.  He still needs to bring his cuff in so that we can make sure it is reading accurately.  Increasing his amlodipine would be reasonable.  He has developed hyperglycemia with hydrochlorothiazide in the past.     4. Thoracic aortic aneurysm. In 2017 it measured 4.2 cm. On the calcium  scoring CT scan measured 3.9 cm.  Last CT May 2023 revealed the aneurysm was 4 cm.  A repeat echo will be done June 2025 along with a CTA.  TTE 5/21/2025 no aneurysmal dilatation seen.  CTA 6/30/2025 TAA 4 cm    5. Aortic stenosis. Mild.  TTE 5/21/2025 EF 60%, DD EF, mild aortic stenosis, lipomatous hypertrophy     EKG today.  Increase atorvastatin to 80 mg.  Echo, exercise nuclear stress test May or June 2025.  CTA chest June 2025.  Fasting blood work in May or June 2025.  Betsy Dubois MD     Instructions and follow up

## 2025-08-11 ENCOUNTER — APPOINTMENT (OUTPATIENT)
Dept: CARDIOLOGY | Facility: CLINIC | Age: 75
End: 2025-08-11
Payer: MEDICARE

## 2025-08-11 VITALS
BODY MASS INDEX: 38.06 KG/M2 | HEART RATE: 75 BPM | OXYGEN SATURATION: 100 % | WEIGHT: 254 LBS | DIASTOLIC BLOOD PRESSURE: 94 MMHG | SYSTOLIC BLOOD PRESSURE: 153 MMHG

## 2025-08-11 DIAGNOSIS — I35.0 NONRHEUMATIC AORTIC VALVE STENOSIS: Primary | ICD-10-CM

## 2025-08-11 DIAGNOSIS — I25.10 ARTERIOSCLEROSIS OF CORONARY ARTERY: Chronic | ICD-10-CM

## 2025-08-11 DIAGNOSIS — E78.2 MIXED HYPERLIPIDEMIA: ICD-10-CM

## 2025-08-11 DIAGNOSIS — I71.21 ANEURYSM OF ASCENDING AORTA WITHOUT RUPTURE: Chronic | ICD-10-CM

## 2025-08-11 DIAGNOSIS — I10 PRIMARY HYPERTENSION: ICD-10-CM

## 2025-08-11 DIAGNOSIS — E78.5 HYPERLIPIDEMIA, UNSPECIFIED: ICD-10-CM

## 2025-08-11 DIAGNOSIS — I10 ESSENTIAL (PRIMARY) HYPERTENSION: ICD-10-CM

## 2025-08-11 PROCEDURE — 1160F RVW MEDS BY RX/DR IN RCRD: CPT | Performed by: INTERNAL MEDICINE

## 2025-08-11 PROCEDURE — 4010F ACE/ARB THERAPY RXD/TAKEN: CPT | Performed by: INTERNAL MEDICINE

## 2025-08-11 PROCEDURE — 3080F DIAST BP >= 90 MM HG: CPT | Performed by: INTERNAL MEDICINE

## 2025-08-11 PROCEDURE — 3077F SYST BP >= 140 MM HG: CPT | Performed by: INTERNAL MEDICINE

## 2025-08-11 PROCEDURE — 1036F TOBACCO NON-USER: CPT | Performed by: INTERNAL MEDICINE

## 2025-08-11 PROCEDURE — 1123F ACP DISCUSS/DSCN MKR DOCD: CPT | Performed by: INTERNAL MEDICINE

## 2025-08-11 PROCEDURE — 99214 OFFICE O/P EST MOD 30 MIN: CPT | Performed by: INTERNAL MEDICINE

## 2025-08-11 PROCEDURE — 1159F MED LIST DOCD IN RCRD: CPT | Performed by: INTERNAL MEDICINE

## 2025-08-11 PROCEDURE — 99212 OFFICE O/P EST SF 10 MIN: CPT

## 2025-08-11 RX ORDER — CHOLECALCIFEROL (VITAMIN D3) 25 MCG
50 TABLET ORAL DAILY
COMMUNITY

## 2025-08-11 RX ORDER — ATORVASTATIN CALCIUM 80 MG/1
80 TABLET, FILM COATED ORAL NIGHTLY
Qty: 90 TABLET | Refills: 3 | Status: SHIPPED | OUTPATIENT
Start: 2025-08-11 | End: 2026-08-11

## 2025-08-11 RX ORDER — AMLODIPINE BESYLATE 5 MG/1
5 TABLET ORAL DAILY
Qty: 90 TABLET | Refills: 3 | Status: SHIPPED | OUTPATIENT
Start: 2025-08-11

## 2025-08-11 ASSESSMENT — ENCOUNTER SYMPTOMS
OCCASIONAL FEELINGS OF UNSTEADINESS: 0
LOSS OF SENSATION IN FEET: 0
DEPRESSION: 0

## 2025-08-11 NOTE — PATIENT INSTRUCTIONS
1. CAD. Markedly elevated calcium score 1013 units the vast majority of which is located in the LAD. Stress testing was normal with excellent functional capacity for age.  TST 7/24/2025 negative EKG above average functional capacity for age normal perfusion EF 55%    2. Hyperlipidemia.  11/8/2024 LDL 67 HDL 41 triglycerides 111 LFTs normal.  While these numbers are good, they are not ideal.  I am asking for him to increase his atorvastatin to 80 mg with a target LDL of less than 55.  6/26/2025 LDL 55 HDL 40 triglycerides 101 LFTs normal    3. Hypertension.  Blood pressures here have been high but blood pressures at home are actually better controlled.  He still needs to bring his cuff in so that we can make sure it is reading accurately.  Increasing his amlodipine would be reasonable.  He has developed hyperglycemia with hydrochlorothiazide in the past.     4. Thoracic aortic aneurysm. In 2017 it measured 4.2 cm. On the calcium scoring CT scan measured 3.9 cm.  Last CT May 2023 revealed the aneurysm was 4 cm.  A repeat echo will be done June 2025 along with a CTA.  TTE 5/21/2025 no aneurysmal dilatation seen.  CTA 6/30/2025 TAA 4 cm. Repeat CT in 2 years.    5. Aortic stenosis. Mild.  TTE 5/21/2025 EF 60%, DDF mild aortic stenosis, lipomatous hypertrophy     RTC  1year.